# Patient Record
Sex: FEMALE | Employment: OTHER | URBAN - METROPOLITAN AREA
[De-identification: names, ages, dates, MRNs, and addresses within clinical notes are randomized per-mention and may not be internally consistent; named-entity substitution may affect disease eponyms.]

---

## 2022-09-06 RX ORDER — TRAMADOL HYDROCHLORIDE 50 MG/1
1 TABLET ORAL EVERY 12 HOURS PRN
COMMUNITY
Start: 2022-08-28

## 2022-09-06 RX ORDER — LEVOTHYROXINE SODIUM 0.1 MG/1
100 TABLET ORAL DAILY
COMMUNITY
Start: 2022-04-27

## 2022-09-06 RX ORDER — HYDROCHLOROTHIAZIDE 25 MG/1
25 TABLET ORAL DAILY
COMMUNITY
Start: 2022-04-27

## 2022-09-06 RX ORDER — AMLODIPINE BESYLATE 5 MG/1
7.5 TABLET ORAL AS DIRECTED
COMMUNITY

## 2022-09-07 ENCOUNTER — HOSPITAL ENCOUNTER (OUTPATIENT)
Dept: PHYSICAL THERAPY | Facility: HOSPITAL | Age: 77
Discharge: HOME | End: 2022-09-07
Attending: ORTHOPAEDIC SURGERY
Payer: COMMERCIAL

## 2022-09-07 DIAGNOSIS — M48.00 SPINAL STENOSIS: ICD-10-CM

## 2022-09-25 ENCOUNTER — LAB ENCOUNTER (OUTPATIENT)
Dept: LAB | Age: 77
End: 2022-09-25
Attending: ORTHOPAEDIC SURGERY

## 2022-09-25 DIAGNOSIS — Z01.818 PRE-OP TESTING: ICD-10-CM

## 2022-09-26 LAB — SARS-COV-2 RNA RESP QL NAA+PROBE: NOT DETECTED

## 2022-09-27 ENCOUNTER — ANESTHESIA EVENT (OUTPATIENT)
Dept: SURGERY | Facility: HOSPITAL | Age: 77
DRG: 455 | End: 2022-09-27
Payer: MEDICARE

## 2022-09-28 ENCOUNTER — ANESTHESIA (OUTPATIENT)
Dept: SURGERY | Facility: HOSPITAL | Age: 77
DRG: 455 | End: 2022-09-28
Payer: MEDICARE

## 2022-09-28 ENCOUNTER — HOSPITAL ENCOUNTER (INPATIENT)
Facility: HOSPITAL | Age: 77
LOS: 1 days | Discharge: HOME | DRG: 455 | End: 2022-09-29
Attending: ORTHOPAEDIC SURGERY | Admitting: ORTHOPAEDIC SURGERY
Payer: MEDICARE

## 2022-09-28 ENCOUNTER — APPOINTMENT (OUTPATIENT)
Dept: GENERAL RADIOLOGY | Facility: HOSPITAL | Age: 77
DRG: 455 | End: 2022-09-28
Attending: ORTHOPAEDIC SURGERY
Payer: MEDICARE

## 2022-09-28 DIAGNOSIS — Z01.818 PRE-OP TESTING: Primary | ICD-10-CM

## 2022-09-28 DIAGNOSIS — M48.00 SPINAL STENOSIS: ICD-10-CM

## 2022-09-28 LAB
APTT PPP: 49.5 SECONDS (ref 23.3–35.6)
INR BLD: 1.01 (ref 0.85–1.16)
PROTHROMBIN TIME: 13.4 SECONDS (ref 11.6–14.8)

## 2022-09-28 PROCEDURE — 95861 NEEDLE EMG 2 EXTREMITIES: CPT | Performed by: ORTHOPAEDIC SURGERY

## 2022-09-28 PROCEDURE — 0SG30AJ FUSION OF LUMBOSACRAL JOINT WITH INTERBODY FUSION DEVICE, POSTERIOR APPROACH, ANTERIOR COLUMN, OPEN APPROACH: ICD-10-PCS | Performed by: ORTHOPAEDIC SURGERY

## 2022-09-28 PROCEDURE — 01NB0ZZ RELEASE LUMBAR NERVE, OPEN APPROACH: ICD-10-PCS | Performed by: ORTHOPAEDIC SURGERY

## 2022-09-28 PROCEDURE — 95940 IONM IN OPERATNG ROOM 15 MIN: CPT | Performed by: ORTHOPAEDIC SURGERY

## 2022-09-28 PROCEDURE — 00BT0ZZ EXCISION OF SPINAL MENINGES, OPEN APPROACH: ICD-10-PCS | Performed by: ORTHOPAEDIC SURGERY

## 2022-09-28 PROCEDURE — 88311 DECALCIFY TISSUE: CPT | Performed by: ORTHOPAEDIC SURGERY

## 2022-09-28 PROCEDURE — 95938 SOMATOSENSORY TESTING: CPT | Performed by: ORTHOPAEDIC SURGERY

## 2022-09-28 PROCEDURE — 88304 TISSUE EXAM BY PATHOLOGIST: CPT | Performed by: ORTHOPAEDIC SURGERY

## 2022-09-28 PROCEDURE — 0SG3071 FUSION OF LUMBOSACRAL JOINT WITH AUTOLOGOUS TISSUE SUBSTITUTE, POSTERIOR APPROACH, POSTERIOR COLUMN, OPEN APPROACH: ICD-10-PCS | Performed by: ORTHOPAEDIC SURGERY

## 2022-09-28 PROCEDURE — 85730 THROMBOPLASTIN TIME PARTIAL: CPT

## 2022-09-28 PROCEDURE — 0QS00ZZ REPOSITION LUMBAR VERTEBRA, OPEN APPROACH: ICD-10-PCS | Performed by: ORTHOPAEDIC SURGERY

## 2022-09-28 PROCEDURE — 85610 PROTHROMBIN TIME: CPT

## 2022-09-28 PROCEDURE — 01NR0ZZ RELEASE SACRAL NERVE, OPEN APPROACH: ICD-10-PCS | Performed by: ORTHOPAEDIC SURGERY

## 2022-09-28 PROCEDURE — 76000 FLUOROSCOPY <1 HR PHYS/QHP: CPT | Performed by: ORTHOPAEDIC SURGERY

## 2022-09-28 PROCEDURE — 4A11X4G MONITORING OF PERIPHERAL NERVOUS ELECTRICAL ACTIVITY, INTRAOPERATIVE, EXTERNAL APPROACH: ICD-10-PCS | Performed by: ORTHOPAEDIC SURGERY

## 2022-09-28 PROCEDURE — 0SG00AJ FUSION OF LUMBAR VERTEBRAL JOINT WITH INTERBODY FUSION DEVICE, POSTERIOR APPROACH, ANTERIOR COLUMN, OPEN APPROACH: ICD-10-PCS | Performed by: ORTHOPAEDIC SURGERY

## 2022-09-28 PROCEDURE — 0SG0071 FUSION OF LUMBAR VERTEBRAL JOINT WITH AUTOLOGOUS TISSUE SUBSTITUTE, POSTERIOR APPROACH, POSTERIOR COLUMN, OPEN APPROACH: ICD-10-PCS | Performed by: ORTHOPAEDIC SURGERY

## 2022-09-28 PROCEDURE — 0QS10ZZ REPOSITION SACRUM, OPEN APPROACH: ICD-10-PCS | Performed by: ORTHOPAEDIC SURGERY

## 2022-09-28 PROCEDURE — 0ST40ZZ RESECTION OF LUMBOSACRAL DISC, OPEN APPROACH: ICD-10-PCS | Performed by: ORTHOPAEDIC SURGERY

## 2022-09-28 PROCEDURE — 0ST20ZZ RESECTION OF LUMBAR VERTEBRAL DISC, OPEN APPROACH: ICD-10-PCS | Performed by: ORTHOPAEDIC SURGERY

## 2022-09-28 DEVICE — RELINE MAS MOD SCREW 6.5X45 2C: Type: IMPLANTABLE DEVICE | Site: BACK | Status: FUNCTIONAL

## 2022-09-28 DEVICE — RELINE MAS MOD REDUCTION EXT: Type: IMPLANTABLE DEVICE | Site: BACK | Status: FUNCTIONAL

## 2022-09-28 DEVICE — RELINE LCK SCRW 5.5 OPEN TULIP: Type: IMPLANTABLE DEVICE | Site: BACK | Status: FUNCTIONAL

## 2022-09-28 DEVICE — OSTEOCEL PRO LARGE BULK BUY: Type: IMPLANTABLE DEVICE | Site: BACK | Status: FUNCTIONAL

## 2022-09-28 DEVICE — RELINE MAS TI ROD 5.5X55 LRDTC: Type: IMPLANTABLE DEVICE | Site: BACK | Status: FUNCTIONAL

## 2022-09-28 DEVICE — RELINE MAS RED SCREW 6.5X45 2C: Type: IMPLANTABLE DEVICE | Site: BACK | Status: FUNCTIONAL

## 2022-09-28 DEVICE — COHERE TLIF-O, 10X10X30MM 4°
Type: IMPLANTABLE DEVICE | Site: BACK | Status: FUNCTIONAL
Brand: COHERE

## 2022-09-28 DEVICE — RELINE MAS TI ROD 5.5X60 LRDTC: Type: IMPLANTABLE DEVICE | Site: BACK | Status: FUNCTIONAL

## 2022-09-28 RX ORDER — MIDAZOLAM HYDROCHLORIDE 1 MG/ML
1 INJECTION INTRAMUSCULAR; INTRAVENOUS EVERY 5 MIN PRN
Status: DISCONTINUED | OUTPATIENT
Start: 2022-09-28 | End: 2022-09-28 | Stop reason: HOSPADM

## 2022-09-28 RX ORDER — HYDROMORPHONE HYDROCHLORIDE 1 MG/ML
0.4 INJECTION, SOLUTION INTRAMUSCULAR; INTRAVENOUS; SUBCUTANEOUS EVERY 5 MIN PRN
Status: DISCONTINUED | OUTPATIENT
Start: 2022-09-28 | End: 2022-09-28 | Stop reason: HOSPADM

## 2022-09-28 RX ORDER — LEVOTHYROXINE SODIUM 0.1 MG/1
100 TABLET ORAL DAILY
Status: DISCONTINUED | OUTPATIENT
Start: 2022-09-29 | End: 2022-09-29

## 2022-09-28 RX ORDER — SODIUM CHLORIDE 9 MG/ML
INJECTION, SOLUTION INTRAVENOUS CONTINUOUS
Status: DISCONTINUED | OUTPATIENT
Start: 2022-09-28 | End: 2022-09-29

## 2022-09-28 RX ORDER — DOCUSATE SODIUM 100 MG/1
100 CAPSULE, LIQUID FILLED ORAL 2 TIMES DAILY
Status: DISCONTINUED | OUTPATIENT
Start: 2022-09-28 | End: 2022-09-29

## 2022-09-28 RX ORDER — ACETAMINOPHEN 500 MG
1000 TABLET ORAL ONCE AS NEEDED
Status: DISCONTINUED | OUTPATIENT
Start: 2022-09-28 | End: 2022-09-28 | Stop reason: HOSPADM

## 2022-09-28 RX ORDER — HYDROMORPHONE HYDROCHLORIDE 1 MG/ML
0.4 INJECTION, SOLUTION INTRAMUSCULAR; INTRAVENOUS; SUBCUTANEOUS EVERY 2 HOUR PRN
Status: DISCONTINUED | OUTPATIENT
Start: 2022-09-28 | End: 2022-09-29

## 2022-09-28 RX ORDER — ONDANSETRON 2 MG/ML
2 INJECTION INTRAMUSCULAR; INTRAVENOUS ONCE
Status: COMPLETED | OUTPATIENT
Start: 2022-09-28 | End: 2022-09-28

## 2022-09-28 RX ORDER — DEXAMETHASONE SODIUM PHOSPHATE 4 MG/ML
VIAL (ML) INJECTION AS NEEDED
Status: DISCONTINUED | OUTPATIENT
Start: 2022-09-28 | End: 2022-09-28 | Stop reason: SURG

## 2022-09-28 RX ORDER — SODIUM CHLORIDE, SODIUM LACTATE, POTASSIUM CHLORIDE, CALCIUM CHLORIDE 600; 310; 30; 20 MG/100ML; MG/100ML; MG/100ML; MG/100ML
INJECTION, SOLUTION INTRAVENOUS CONTINUOUS
Status: DISCONTINUED | OUTPATIENT
Start: 2022-09-28 | End: 2022-09-28

## 2022-09-28 RX ORDER — HYDROMORPHONE HYDROCHLORIDE 1 MG/ML
0.6 INJECTION, SOLUTION INTRAMUSCULAR; INTRAVENOUS; SUBCUTANEOUS EVERY 5 MIN PRN
Status: DISCONTINUED | OUTPATIENT
Start: 2022-09-28 | End: 2022-09-28 | Stop reason: HOSPADM

## 2022-09-28 RX ORDER — OXYCODONE HYDROCHLORIDE 5 MG/1
5 TABLET ORAL EVERY 4 HOURS PRN
Status: DISCONTINUED | OUTPATIENT
Start: 2022-09-28 | End: 2022-09-29

## 2022-09-28 RX ORDER — METOCLOPRAMIDE HYDROCHLORIDE 5 MG/ML
10 INJECTION INTRAMUSCULAR; INTRAVENOUS EVERY 8 HOURS PRN
Status: DISCONTINUED | OUTPATIENT
Start: 2022-09-28 | End: 2022-09-28 | Stop reason: HOSPADM

## 2022-09-28 RX ORDER — SODIUM CHLORIDE, SODIUM LACTATE, POTASSIUM CHLORIDE, CALCIUM CHLORIDE 600; 310; 30; 20 MG/100ML; MG/100ML; MG/100ML; MG/100ML
INJECTION, SOLUTION INTRAVENOUS CONTINUOUS
Status: DISCONTINUED | OUTPATIENT
Start: 2022-09-28 | End: 2022-09-28 | Stop reason: HOSPADM

## 2022-09-28 RX ORDER — HYDROCHLOROTHIAZIDE 25 MG/1
25 TABLET ORAL DAILY
Status: DISCONTINUED | OUTPATIENT
Start: 2022-09-29 | End: 2022-09-29

## 2022-09-28 RX ORDER — HYDROCODONE BITARTRATE AND ACETAMINOPHEN 5; 325 MG/1; MG/1
1 TABLET ORAL ONCE AS NEEDED
Status: DISCONTINUED | OUTPATIENT
Start: 2022-09-28 | End: 2022-09-28 | Stop reason: HOSPADM

## 2022-09-28 RX ORDER — TERIPARATIDE 250 UG/ML
20 INJECTION, SOLUTION SUBCUTANEOUS DAILY
COMMUNITY

## 2022-09-28 RX ORDER — ONDANSETRON 2 MG/ML
INJECTION INTRAMUSCULAR; INTRAVENOUS AS NEEDED
Status: DISCONTINUED | OUTPATIENT
Start: 2022-09-28 | End: 2022-09-28 | Stop reason: SURG

## 2022-09-28 RX ORDER — LABETALOL HYDROCHLORIDE 5 MG/ML
5 INJECTION, SOLUTION INTRAVENOUS EVERY 5 MIN PRN
Status: DISCONTINUED | OUTPATIENT
Start: 2022-09-28 | End: 2022-09-28 | Stop reason: HOSPADM

## 2022-09-28 RX ORDER — CELECOXIB 200 MG/1
200 CAPSULE ORAL ONCE
Status: COMPLETED | OUTPATIENT
Start: 2022-09-28 | End: 2022-09-28

## 2022-09-28 RX ORDER — LIDOCAINE HYDROCHLORIDE 10 MG/ML
INJECTION, SOLUTION EPIDURAL; INFILTRATION; INTRACAUDAL; PERINEURAL AS NEEDED
Status: DISCONTINUED | OUTPATIENT
Start: 2022-09-28 | End: 2022-09-28 | Stop reason: SURG

## 2022-09-28 RX ORDER — SODIUM PHOSPHATE, DIBASIC AND SODIUM PHOSPHATE, MONOBASIC 7; 19 G/133ML; G/133ML
1 ENEMA RECTAL ONCE AS NEEDED
Status: DISCONTINUED | OUTPATIENT
Start: 2022-09-28 | End: 2022-09-29

## 2022-09-28 RX ORDER — ONDANSETRON 2 MG/ML
4 INJECTION INTRAMUSCULAR; INTRAVENOUS EVERY 6 HOURS PRN
Status: DISCONTINUED | OUTPATIENT
Start: 2022-09-28 | End: 2022-09-28 | Stop reason: HOSPADM

## 2022-09-28 RX ORDER — SENNOSIDES 8.6 MG
17.2 TABLET ORAL NIGHTLY
Status: DISCONTINUED | OUTPATIENT
Start: 2022-09-28 | End: 2022-09-29

## 2022-09-28 RX ORDER — ONDANSETRON 2 MG/ML
4 INJECTION INTRAMUSCULAR; INTRAVENOUS EVERY 6 HOURS PRN
Status: DISCONTINUED | OUTPATIENT
Start: 2022-09-28 | End: 2022-09-29

## 2022-09-28 RX ORDER — NALOXONE HYDROCHLORIDE 0.4 MG/ML
80 INJECTION, SOLUTION INTRAMUSCULAR; INTRAVENOUS; SUBCUTANEOUS AS NEEDED
Status: DISCONTINUED | OUTPATIENT
Start: 2022-09-28 | End: 2022-09-28 | Stop reason: HOSPADM

## 2022-09-28 RX ORDER — DIPHENHYDRAMINE HCL 25 MG
25 CAPSULE ORAL EVERY 4 HOURS PRN
Status: DISCONTINUED | OUTPATIENT
Start: 2022-09-28 | End: 2022-09-29

## 2022-09-28 RX ORDER — BISACODYL 10 MG
10 SUPPOSITORY, RECTAL RECTAL
Status: DISCONTINUED | OUTPATIENT
Start: 2022-09-28 | End: 2022-09-29

## 2022-09-28 RX ORDER — MIDAZOLAM HYDROCHLORIDE 1 MG/ML
INJECTION INTRAMUSCULAR; INTRAVENOUS AS NEEDED
Status: DISCONTINUED | OUTPATIENT
Start: 2022-09-28 | End: 2022-09-28 | Stop reason: SURG

## 2022-09-28 RX ORDER — OXYCODONE HYDROCHLORIDE 5 MG/1
2.5 TABLET ORAL EVERY 4 HOURS PRN
Status: DISCONTINUED | OUTPATIENT
Start: 2022-09-28 | End: 2022-09-29

## 2022-09-28 RX ORDER — CEFAZOLIN SODIUM/WATER 2 G/20 ML
2 SYRINGE (ML) INTRAVENOUS ONCE
Status: COMPLETED | OUTPATIENT
Start: 2022-09-28 | End: 2022-09-28

## 2022-09-28 RX ORDER — HYDROMORPHONE HYDROCHLORIDE 1 MG/ML
0.2 INJECTION, SOLUTION INTRAMUSCULAR; INTRAVENOUS; SUBCUTANEOUS EVERY 5 MIN PRN
Status: DISCONTINUED | OUTPATIENT
Start: 2022-09-28 | End: 2022-09-28 | Stop reason: HOSPADM

## 2022-09-28 RX ORDER — HYDROMORPHONE HYDROCHLORIDE 1 MG/ML
0.2 INJECTION, SOLUTION INTRAMUSCULAR; INTRAVENOUS; SUBCUTANEOUS EVERY 2 HOUR PRN
Status: DISCONTINUED | OUTPATIENT
Start: 2022-09-28 | End: 2022-09-29

## 2022-09-28 RX ORDER — DIPHENHYDRAMINE HYDROCHLORIDE 50 MG/ML
25 INJECTION INTRAMUSCULAR; INTRAVENOUS EVERY 4 HOURS PRN
Status: DISCONTINUED | OUTPATIENT
Start: 2022-09-28 | End: 2022-09-29

## 2022-09-28 RX ORDER — POLYETHYLENE GLYCOL 3350 17 G/17G
17 POWDER, FOR SOLUTION ORAL DAILY PRN
Status: DISCONTINUED | OUTPATIENT
Start: 2022-09-28 | End: 2022-09-29

## 2022-09-28 RX ORDER — HYDROCODONE BITARTRATE AND ACETAMINOPHEN 5; 325 MG/1; MG/1
2 TABLET ORAL ONCE AS NEEDED
Status: DISCONTINUED | OUTPATIENT
Start: 2022-09-28 | End: 2022-09-28 | Stop reason: HOSPADM

## 2022-09-28 RX ORDER — CEFAZOLIN SODIUM/WATER 2 G/20 ML
2 SYRINGE (ML) INTRAVENOUS EVERY 8 HOURS
Status: COMPLETED | OUTPATIENT
Start: 2022-09-28 | End: 2022-09-29

## 2022-09-28 RX ORDER — ACETAMINOPHEN 500 MG
1000 TABLET ORAL ONCE
Status: DISCONTINUED | OUTPATIENT
Start: 2022-09-28 | End: 2022-09-28 | Stop reason: HOSPADM

## 2022-09-28 RX ORDER — METOCLOPRAMIDE HYDROCHLORIDE 5 MG/ML
10 INJECTION INTRAMUSCULAR; INTRAVENOUS EVERY 8 HOURS PRN
Status: DISCONTINUED | OUTPATIENT
Start: 2022-09-28 | End: 2022-09-29

## 2022-09-28 RX ADMIN — LIDOCAINE HYDROCHLORIDE 30 MG: 10 INJECTION, SOLUTION EPIDURAL; INFILTRATION; INTRACAUDAL; PERINEURAL at 08:53:00

## 2022-09-28 RX ADMIN — DEXAMETHASONE SODIUM PHOSPHATE 8 MG: 4 MG/ML VIAL (ML) INJECTION at 09:00:00

## 2022-09-28 RX ADMIN — SODIUM CHLORIDE, SODIUM LACTATE, POTASSIUM CHLORIDE, CALCIUM CHLORIDE: 600; 310; 30; 20 INJECTION, SOLUTION INTRAVENOUS at 08:48:00

## 2022-09-28 RX ADMIN — CEFAZOLIN SODIUM/WATER 2 G: 2 G/20 ML SYRINGE (ML) INTRAVENOUS at 09:01:00

## 2022-09-28 RX ADMIN — MIDAZOLAM HYDROCHLORIDE 2 MG: 1 INJECTION INTRAMUSCULAR; INTRAVENOUS at 08:48:00

## 2022-09-28 RX ADMIN — ONDANSETRON 4 MG: 2 INJECTION INTRAMUSCULAR; INTRAVENOUS at 11:24:00

## 2022-09-28 RX ADMIN — SODIUM CHLORIDE, SODIUM LACTATE, POTASSIUM CHLORIDE, CALCIUM CHLORIDE: 600; 310; 30; 20 INJECTION, SOLUTION INTRAVENOUS at 12:06:00

## 2022-09-28 NOTE — ANESTHESIA PROCEDURE NOTES
Airway  Date/Time: 9/28/2022 8:54 AM  Urgency: elective    Airway not difficult    General Information and Staff    Patient location during procedure: OR  Anesthesiologist: Felton Maya MD  Performed: anesthesiologist     Indications and Patient Condition  Indications for airway management: anesthesia  Sedation level: deep  Preoxygenated: yes  Patient position: sniffing  Mask difficulty assessment: 1 - vent by mask    Final Airway Details  Final airway type: endotracheal airway      Successful airway: ETT  Cuffed: yes   Successful intubation technique: direct laryngoscopy  Endotracheal tube insertion site: oral  Blade: Beto  Blade size: #3  ETT size (mm): 7.5    Cormack-Lehane Classification: grade I - full view of glottis  Placement verified by: chest auscultation and capnometry   Measured from: teeth  ETT to teeth (cm): 20  Number of attempts at approach: 1

## 2022-09-28 NOTE — PLAN OF CARE
Pt A&Ox4, VSS on RA, SCD's and teds on. Pt reports mild to moderate pain to mid back; PO pain medication given with adequate relief. Dressing to incision is C/D/I; ice applied. 0.9 NS running at 100 mL/hr. Gunter in place; last BM 9/27.  at bedside, pt resting comfortably. Pt states they were sized for a chair brace; brace not at bedside. Will contact vendor for delivery. Plan of care reviewed with patient. Patient verbalizes understanding.

## 2022-09-28 NOTE — ANESTHESIA POSTPROCEDURE EVALUATION
[address] [address] Patient Status:  Inpatient   Age/Gender 69year old female MRN [id number]   Location [address] [facility] Attending Waylon Naik MD   Hosp Day # 0 PCP Lev Woo DO       Anesthesia Post-op Note    LUMBAR 4-LUMBAR 5, LUMBAR 5-SACRUM 1 TRANSFORAMINAL LUMBAR INTERBODY FUSION WITH INSTRUMENTATION    Procedure Summary     Date: 09/28/22 Room / Location: [facility] MAIN OR 11 / [facility] MAIN OR    Anesthesia Start: 0848 Anesthesia Stop: 6767    Procedures:       LUMBAR 4-LUMBAR 5, LUMBAR 5-SACRUM 1 TRANSFORAMINAL LUMBAR INTERBODY FUSION WITH INSTRUMENTATION (N/A Spine Lumbar)      INTRAOPERATIVE NEURO MONITORING (N/A ) Diagnosis: (SPINAL STENOSIS, SPONDYLOLISTHESIS, LUMBAR RADICULIOPATHY)    Surgeons: Waylon Naik MD Anesthesiologist: Arvind Kimball MD    Anesthesia Type: general ASA Status: 2          Anesthesia Type: general    Vitals Value Taken Time   /60 09/28/22 1210   Temp 98.9 09/28/22 1210   Pulse 85 09/28/22 1210   Resp 16 09/28/22 1210   SpO2 97 09/28/22 1210       Patient Location: PACU    Anesthesia Type: general    Airway Patency: extubated    Postop Pain Control: adequate    Mental Status: mildly sedated but able to meaningfully participate in the post-anesthesia evaluation    Nausea/Vomiting: none    Cardiopulmonary/Hydration status: stable euvolemic    Complications: no apparent anesthesia related complications    Postop vital signs: stable    Dental Exam: Unchanged from Preop

## 2022-09-28 NOTE — PROGRESS NOTES
NURSING ADMISSION NOTE      Patient admitted via Cart  Oriented to room. Safety precautions initiated. Bed in low position. Call light in reach.       Dr. Remberto Molina paged for new consult

## 2022-09-28 NOTE — INTERVAL H&P NOTE
Pre-op Diagnosis: SPINAL STENOSIS, SPONDYLOLISTHESIS, LUMBAR RADICULIOPATHY    The above referenced H&P was reviewed by Wilberto Lindsey MD on 9/28/2022, the patient was examined and no significant changes have occurred in the patient's condition since the H&P was performed. I discussed with the patient and/or legal representative the potential benefits, risks and side effects of this procedure; the likelihood of the patient achieving goals; and potential problems that might occur during recuperation. I discussed reasonable alternatives to the procedure, including risks, benefits and side effects related to the alternatives and risks related to not receiving this procedure. We will proceed with procedure as planned.

## 2022-09-29 VITALS
BODY MASS INDEX: 23.22 KG/M2 | TEMPERATURE: 98 F | OXYGEN SATURATION: 96 % | HEART RATE: 76 BPM | SYSTOLIC BLOOD PRESSURE: 132 MMHG | HEIGHT: 64 IN | WEIGHT: 136 LBS | DIASTOLIC BLOOD PRESSURE: 71 MMHG | RESPIRATION RATE: 18 BRPM

## 2022-09-29 LAB
HCT VFR BLD AUTO: 33.9 %
HGB BLD-MCNC: 10.7 G/DL

## 2022-09-29 PROCEDURE — 97116 GAIT TRAINING THERAPY: CPT

## 2022-09-29 PROCEDURE — 85018 HEMOGLOBIN: CPT | Performed by: PHYSICIAN ASSISTANT

## 2022-09-29 PROCEDURE — 85014 HEMATOCRIT: CPT | Performed by: PHYSICIAN ASSISTANT

## 2022-09-29 PROCEDURE — 97161 PT EVAL LOW COMPLEX 20 MIN: CPT

## 2022-09-29 PROCEDURE — 97165 OT EVAL LOW COMPLEX 30 MIN: CPT

## 2022-09-29 PROCEDURE — 97530 THERAPEUTIC ACTIVITIES: CPT

## 2022-09-29 PROCEDURE — 97535 SELF CARE MNGMENT TRAINING: CPT

## 2022-09-29 NOTE — PLAN OF CARE
Patient is alert and orienteated with  at bedside. Complaints of back pain. Medication given as prescribed. Patient is post op day 0 of spine surgery. Back dressing clean dry and intact with ice pack. Patient is lying on the right side, patient is educated on log rolling and no twisting of the spine. Gunter catheter intact draining clear yellow urine. IV infusing to the right hand, no signs of infiltration. Plan of care and pain education discussed with patient.  will bring back brace in the am for PT/OT. No further questions or concerns at this time.

## 2022-09-29 NOTE — PLAN OF CARE
Patient A&Ox4, VSS on room air. POD #1, dressing to lower back C/D/I. Chair back brace on. Patient denies pain or discomfort. Reporting tingling to R lower leg, PA aware. Ambulating to BR with minimal assist and walker. PT/OT to see this morning. Tolerating diet, LBM 9/28. DTV by 12:00. Plan to DC home later today if medically cleared. 1245: DC paperwork reviewed with patient. Reviewed instructions, restrictions, medications and follow up appointments. Utilized teach back method with  for dressing change to back. New dressing C/D/I. Will DC home with . All questions answered, verbalized understanding.

## 2022-09-30 NOTE — OPERATIVE REPORT
[facility]    PATIENT'S NAME: Antonina Mooney   ATTENDING PHYSICIAN: Britney Cui M.D. OPERATING PHYSICIAN: Britney Cui M.D. PATIENT ACCOUNT#:   [de-identified]    LOCATION:  [address] [place] [address]  MEDICAL RECORD #:   [id number]       YOB: 1945  ADMISSION DATE:       09/28/2022      OPERATION DATE:  09/28/2022    OPERATIVE REPORT      PREOPERATIVE DIAGNOSIS:  L4-5, L5-S1 kyphosis, spondylosis, and spondylolisthesis with stenosis and epidural mass. POSTOPERATIVE DIAGNOSIS:  L4-5, L5-S1 kyphosis, spondylosis, and spondylolisthesis with stenosis and epidural mass. PROCEDURE PERFORMED:  L4-5, L5-S1 decompression, interbody reconstruction, and fusion with removal of epidural mass. INDICATIONS:  The patient had failed reasonable conservative measures which are outlined in the patient's clinic medical record. Physical therapy, medical management, injections, alternative treatment are among those offered unless motor deficits are progressive. Indications for surgery are based on scientific literature and NIC guidelines. A thorough meeting with the patient and at least one key caregiver was had. The risks and benefits were discussed in significant detail. The risks include, but are not limited to, bleeding, infection, spinal leaks, nerve injuries, hardware failure/migration, pseudarthrosis, adjacent segment disease, and need for further surgery. I have gone over the operation using models, diagrams, and the patient's own imaging studies. Additional written and digital sources were provided. No guarantees were made. ASSISTANT:  Celsa Sung. JOSELITO Payne. A skilled and experienced assistant was required for the entire surgical procedure. As a lumbar spinal reconstruction, the procedure is done in immediate proximity to critical neural elements and is done in close proximity to the critical vascular and visceral structures.   Much of the surgery is done in and around the cauda equine and its exiting nerves. The complex reconstructive nature of the procedure requires application of screws, rods, and interbody devices. Any assistance, including, but not limited to, retraction, suction, and other means of facilitation of the procedure requires an individual with substantial postgraduate training and substantial spinal surgical experience. ESTIMATED BLOOD LOSS:  100 mL. SPECIMENS:  L4-5 epidural mass. DESCRIPTION OF PROCEDURE:  After obtaining informed consent, the patient was taken to the operating room. SCDs and GERARDO hose were applied. Preoperative antibiotics were delivered. Patient was placed in the prone position. Neuromonitoring leads were put in place and baselines were achieved. An internal twitch test verified the patient had 4 out of 4 twitches. A physician was confirmed as evaluating all neuromonitoring data. All bony prominences were padded. The back was sterilely prepped and draped. AP fluoroscopy was wheeled in. We marked pedicles at all levels outlined above bilaterally. Two separate incisions 1-1/2 fingerbreadths lateral to this approximately an inch and a half long were made with a larger incision on the patient's more symptomatic side. This was done with a #10 blade. Bovie electrocautery was used for hemostasis. Dissection was taken down to the level of the fascia. Fascia was split in paraspinal fashion bilaterally. I-PAS needles were utilized with live neuromonitoring in place. Baselines had been achieved. We had confirmed 4 out of 4 twitches with our colleagues and neuromonitoring. Under fluoroscopic guidance, we targeted the lateral aspect of the pedicles bilaterally. These I-PAS needles were then impacted medially and then checked under lateral fluoroscopy for being in appropriate position. Bone marrow was aspirated by placing a needle through the skin and through the periosteum and into the pedicle.   A 10 mL syringe was used to aspirate several milliliters of bone marrow aspirate. The needle was removed with a twisting motion. All I-PAS needles were found to be well above acceptable thresholds. Styluses were removed. K-wires were applied and advanced. Inner styluses were removed as well. Both sides had dilators applied and had appropriate taps applied. We placed a modular screw with hoop shims attached over the K-wire. Both taps were applied with live neuromonitoring and found to have thresholds well above acceptable limits. We repeated the same steps at distal levels without difficulty. The retractor apparatus was placed over the hoop shims. A medial retractor was sized and applied. All tissues were retracted out of harm's way with regards to muscle in the paraspinal plane and an articulating arm was attached to the retractor. Bovie electrocautery was used to remove remaining tissue over the pars and facet. Pre-operative measurements documenting a mismatch between pelvic incidence and lumbar lordosis was made, thus documenting clinically significant kyphosis. As a result, lumbar osteotomies are required to improve the patient's overall sagittal balance. Our attention first turned to the L4-5 level. The facet was osteotomized and resected. A bur was used to remove additional bone. Pars was resected as well. We identified the lateral ligamentum flavum and resected that with a Lara ball in place over the dura and a 4 and 5-mm Kerrison. Pars was resected as well. The area was further osteotomized from cranial to caudal pedicle using bur, Kerrison rongeurs, and other instruments. Neural elements were not harmed during this process. The osteotomy facilitated reduction of the patient's kyphosis by allowing for compression via pedicle screws at the conclusion of the case. A bur was used to remove additional bone.   We identified the lateral ligamentum flavum and resected that with a Lara ball in place over the dura and a 4 and 5-mm Kerrison. Neural elements were identified and defined for a standard posterior interbody fusion. Significant spinal stenosis was identified consistent with the patient's radiculopathy and deficits. We then were required to perform a technically demanding decompression using fine microcurettes, Kerrison rongeurs, microinstruments, and magnification. Despite adherence of the compressive pathology to neural elements, we were able to define tissue planes to help facilitate release of neurocompressive pathology. The procedure required additional time and technique beyond that of the interbody fusion itself. Contralateral decompression was undertaken, thus creating a bilateral decompression and bilateral microforaminotomy and hemilaminotomy using undercutting technique. With undercutting technique and a Lara ball in place, we excised ligamentum flavum through the bone at the cranial foramina as needed, facilitating a serial cranial decompression and undercutting  microforaminotomy and laminectomy. This was done in an undercutting fashion as well and done to examine the central and contralateral neural elements facilitating central bilateral decompression as well. The traversing nerve root was identified and thoroughly decompressed. Undercutting decompression was undertaken. An extraforaminal and transfacet/transpedicular decompression was utilized to perform a thorough decompression of the exiting nerve root and remove redundant annulus and any herniated nucleus pulposus. During the process of decompression, additional cystic pathology in excess of degenerative anatomy was identified. The pathology was carefully evaluated, and the decision was made to remove it using microdecompressive techniques. Additional specialized instruments were requested and utilized to establish extradural planes. Microcurettes and pituitaries were employed to remove the pathology.   The tissue was examined and sent to Pathology. Please see hospital reports for results. Our attention then turned to utilization of the separate contralateral incision. With a retractor in place, we localized the far lateral region of the level. We examined areas more farther laterally to facilitate a transpedicular/transforaminal decompression. Nerve root retractor was applied. We were able to retract the traversing nerve root over. Annulotomy was performed. Farther lateral structures and paraspinal muscles were dissected away from the area of the transverse process and farther anteriorly. Psoas muscle tissues were also dissected free in the far lateral plane. Significant lateral compressive disease was identified consistent with the patient's radiculopathy and deficits. We then were required to perform a technically demanding decompression using fine microcurettes, Kerrison rongeurs, microinstruments, and magnification. Despite adherence of the compressive pathology to neural elements, we were able to define tissue planes to help facilitate release of neurocompressive pathology. The procedure required additional time and technique beyond that of the interbody fusion itself. Curettes, cindy, and pituitary rongeurs were used to remove the rest of the remaining disc in order to perform lateral extraforaminal and transpedicular discectomy. Endplates were prepared. Curettes and osteotomes were used to perform bony anterior release and osteotomize any anterior column ossification. We applied some distraction across the pedicle screws. We sized for a cage. Allograft complex was impacted into the lateral disc space, and the cage was impacted as well with nerve roots safely out of harm's way. Our attention then turned to the adjacent L5-S1 level where we repeated the same steps. The retractor apparatus was placed over the hoop shims. A medial retractor was sized and applied.   All tissues were retracted out of harm's way with regards to muscle in the paraspinal plane, and an articulating arm was attached to the retractor. Bovie electrocautery was used to remove remaining tissue over the pars and facet. Pre-operative measurements documenting a mismatch between pelvic incidence and lumbar lordosis was made thus documenting clinically significant kyphosis. The facet was osteotomized and resected. A bur was used to remove additional bone. Pars was resected as well. We identified the lateral ligamentum flavum and resected that with a Lara ball in place over the dura and a 4 and 5 mm Kerrison. Significant spinal stenosis was identified consistent with the patient's radiculopathy and deficits. We then were required to perform a technically demanding decompression using fine microcurettes, Kerrison rongeurs, microinstruments, and magnification. Despite adherence of the compressive pathology to neural elements, we were able to define tissue planes to help facilitate release of neurocompressive pathology. The procedure required additional time and technique beyond that of the interbody fusion itself. The traversing nerve root was identified and thoroughly decompressed. Contralateral decompression was undertaken, thus creating a bilateral decompression and bilateral microforaminotomy and hemilaminotomy using undercutting technique. With undercutting technique and a Lara ball in place, we excised ligamentum flavum through the bone at the cranial foramina as needed, facilitating a serial cranial decompression and undercutting microforaminotomy and hemilaminotomy versus undercutting hemilaminectomy. This was done in an undercutting fashion and done to examine the central and contralateral neural elements facilitating central bilateral decompression as well.       Because of additional disease and neurocompressive pathology in the extraforaminal space, an extraforaminal and transpedicular decompression was utilized to perform a thorough decompression of the exiting nerve root and remove redundant annulus and any herniated nucleus pulposus. Our attention then turned to a separate contralateral incision with a unique fascial incision. The retractor was applied. Farther lateral structures and paraspinal muscles were dissected away from the area of the transverse process. Far lateral structures were also dissected free, including psoas muscle tissues. Curettes, cindy, and pituitary rongeurs were used to remove the rest of the remaining disc in order to perform lateral extraforaminal and transpedicular discectomy. Nerve root retractor was applied. We were able to retract the traversing nerve root over. Annulotomy was performed and endplates were prepared. Curettes and osteotomes were used perform bony anterior release and osteotomize any anterior column ossification. We applied some distraction across the pedicle screws. We sized for a cage. Allograft complex was impacted into the lateral disc space, and the case was impacted as well, with nerve roots safely out of harm's way. Utilizing the incisions and fascial openings previously utilized for the osteotomy and decompression we proceeded with a posterolateral fusion at the L4-5 and L5-S1 levels. Posterior elements were decorticated. Allograft and local bone graft were applied. The wounds were thoroughly irrigated. Hemostasis was obtained. Tulip heads were placed on the contralateral side. Then, with dilators in place, the screws were placed. Instrumentation was placed on the right over K-wires in standard technique and fluoroscopy. Screws were advanced under fluoroscopy. All screws were tested using real-time neuromonitoring. Thresholds were above acceptable values.   Rods and top tighteners were applied bilaterally, and final tightening was applied with compression devices to make use of the osteotomy, thereby reducing kyphosis and pelvic incidence and lordosis mismatch. X-rays confirmed appropriate localization of our instrumentation. Valsalva maneuver confirmed that there was no spinal leak. The wound was thoroughly irrigated. Hemostasis was maintained. The fascia was reapproximated bilaterally using 1-0 Vicryl. A 2-0 Vicryl was used for subcutaneous tissues. A running 3-0 subcuticular stitch was applied. Steri-Strips were applied. Sterile dressings were applied. The patient was extubated and taken to the recovery room in stable condition and was neurologically intact on arrival and had improvement of leg pain. SSEPs remained at their baseline. Needle and Ray-Aroldo counts were correct at the conclusion of the case.     Dictated By Pham Gerber M.D.  d: 09/30/2022 13:38:27  t: 09/30/2022 17:02:34  Job [id number]  MILES/

## 2023-01-12 NOTE — BRIEF OP NOTE
Pre-Operative Diagnosis: SPINAL STENOSIS, SPONDYLOLISTHESIS, LUMBAR RADICULIOPATHY     Post-Operative Diagnosis: SPINAL STENOSIS, SPONDYLOLISTHESIS, LUMBAR RADICULIOPATHY      Procedure Performed:   LUMBAR 4-LUMBAR 5, LUMBAR 5-SACRUM 1 TRANSFORAMINAL LUMBAR INTERBODY FUSION WITH INSTRUMENTATION    Surgeon(s) and Role:     * Amado Ramirez MD - Primary    Assistant(s):  PA: Kelin Hoffman     Surgical Findings: above     Specimen: L4-5 epidural mass     Estimated Blood Loss: Blood Output: 100 mL (9/28/2022 11:17 AM)      Dictation Number:      ALDEN Gonzalez  9/28/2022  12:06 PM Report given to [facility] for [employer] [facility]. Updated on condition, labs and diagnostics. All questions were answered.

## 2023-06-05 ENCOUNTER — TELEPHONE (OUTPATIENT)
Dept: GASTROENTEROLOGY | Age: 78
End: 2023-06-05

## (undated) DEVICE — 3.0MM PRECISION ROUND

## (undated) DEVICE — SUT VICRYL 2-0 FSL J589H

## (undated) DEVICE — Device

## (undated) DEVICE — C-ARMOR C-ARM EQUIPMENT COVERS CLEAR STERILE UNIVERSAL FIT 12 PER CASE: Brand: C-ARMOR

## (undated) DEVICE — RELINE MAS BLADE FASCIAL SPLIT

## (undated) DEVICE — GRAFT DELIVERY SYS MODULE

## (undated) DEVICE — 450 ML BOTTLE OF 0.05% CHLORHEXIDINE GLUCONATE IN 99.95% STERILE WATER FOR IRRIGATION, USP AND APPLICATOR.: Brand: IRRISEPT ANTIMICROBIAL WOUND LAVAGE

## (undated) DEVICE — STERILE POLYISOPRENE POWDER-FREE SURGICAL GLOVES: Brand: PROTEXIS

## (undated) DEVICE — WRAP COOLING BACK W/NO PILLOW

## (undated) DEVICE — 3M™ TEGADERM™ TRANSPARENT FILM DRESSING, 1626W, 4 IN X 4-3/4 IN (10 CM X 12 CM), 50 EACH/CARTON, 4 CARTON/CASE: Brand: 3M™ TEGADERM™

## (undated) DEVICE — FLOSEAL HEMOSTATIC MATRIX, 5ML: Brand: FLOSEAL HEMOSTATIC MATRIX

## (undated) DEVICE — SOLUTION  .9 1000ML BTL

## (undated) DEVICE — PEN SKIN MARKING REG TIP VIOLT

## (undated) DEVICE — Device: Brand: INTELLICART™

## (undated) DEVICE — LAMINECTOMY CDS: Brand: MEDLINE INDUSTRIES, INC.

## (undated) DEVICE — ELECTRODE EDGE PENCIL 10FT

## (undated) DEVICE — RELINE POWER

## (undated) DEVICE — NV CLIP DSC&IN-LINE ACTIVATOR

## (undated) DEVICE — C-ARM: Brand: UNBRANDED

## (undated) DEVICE — 3M(TM) TEGADERM(TM) TRANSPARENT FILM DRESSING FRAME STYLE 1628: Brand: 3M™ TEGADERM™

## (undated) DEVICE — MAXCESS MAS TLIF 2 KIT ACCESS

## (undated) DEVICE — UNDYED BRAIDED (POLYGLACTIN 910), SYNTHETIC ABSORBABLE SUTURE: Brand: COATED VICRYL

## (undated) DEVICE — WIRE FX PRCPT KRSH BVL BLNT

## (undated) DEVICE — 11.1-M5 MULTIMODALITY KIT 5

## (undated) DEVICE — SUT VICRYL 1 OS-6 J535H

## (undated) DEVICE — NV I-PAS III DIAMOND TIP

## (undated) DEVICE — COVER,TABLE,44X90,STERILE: Brand: MEDLINE

## (undated) DEVICE — SLEEVE KENDALL SCD EXPRESS MED

## (undated) DEVICE — LIGHT HANDLE

## (undated) DEVICE — DERMA+FLEX TISSUE ADHESIVE

## (undated) NOTE — LETTER
CLARIFICATION FOR E-SSS    To: Stas Stanley MD Date:  9/6/2022    Patient Name: Main Montalvo / Sex: 5/29/1945-A: 69 y  Female  CSN: [id number]  Medical Records: [id number]    Procedure Description:  LUMBAR 4-LUMBAR 5, LUMBAR 5-SACRUM 1 TRANSFORAMINAL LUMBAR INTERBODY FUSION WITH INSTRUMENTATION    Please note that clarification is needed on the Electronic-Surgery Scheduling Sheet (E-SSS)  the original is included with this letter. Please have physician review and make changes on the faxed copy of the E-SSS. Please clarify Celebrex dosage    ALL CHANGES MUST BE DOCUMENTED ON THE E-SSS AND SIGNED BY THE PHYSICIAN    After physician has made the changes and signed the E-SSS please fax it to [phone].      If you have any questions please call Pre-Admission Testing at [phone]    Thank you

## (undated) NOTE — LETTER
OUTSIDE TESTING RESULT REQUEST     IMPORTANT: FOR YOUR IMMEDIATE ATTENTION    Please FAX all test results listed below to: [phone]       * * * * If testing is NOT complete, arrange with patient A.S.A.P. * * * *      Patient Name: Chidi Teran  Surgery Date: 2022  CSN: [id number]  Medical Record: [id number]   : 1945 - A: 69 y      Sex: female  Surgeon(s):  Rashida Mejia MD  Procedure: LUMBAR 4-LUMBAR 5, LUMBAR 5-SACRUM 1 TRANSFORMINAL LUMBAR INTERBODY FUSION WITH INSTRUMENTATION  Anesthesia Type: General     Surgeon: Rashida Mejia MD     The following Testing and Time Line are REQUIRED PER ANESTHESIA     EKG READ AND SIGNED WITHIN   90 days  CBC [with Differential & Platelets] within  60 days  BMP (requires 4 hour fast) within  60 days  PT/INR within  30 days  PTT within  30 days  UA with Reflex to Culture within  14 days  MSSA/MRSA Nasal screening within 30 days      Thank You,   Sent by:ANDREI Fernando - Pre-Admission Testing